# Patient Record
Sex: MALE | Race: WHITE | NOT HISPANIC OR LATINO | URBAN - METROPOLITAN AREA
[De-identification: names, ages, dates, MRNs, and addresses within clinical notes are randomized per-mention and may not be internally consistent; named-entity substitution may affect disease eponyms.]

---

## 2020-11-16 ENCOUNTER — IMPORTED ENCOUNTER (OUTPATIENT)
Dept: URBAN - METROPOLITAN AREA CLINIC 31 | Facility: CLINIC | Age: 30
End: 2020-11-16

## 2020-11-16 PROBLEM — H53.2: Noted: 2020-11-16

## 2020-11-16 PROBLEM — H49.22: Noted: 2020-11-16

## 2020-11-16 PROBLEM — H50.012: Noted: 2020-11-16

## 2020-11-16 PROCEDURE — 99203 OFFICE O/P NEW LOW 30 MIN: CPT

## 2020-11-16 NOTE — PATIENT DISCUSSION
Sixth Nerve Palsy OS -- the patient has signs and symptoms consistent with a left sixth nerve palsy. The condition and possible etiologies were discussed with the patient.

## 2020-11-16 NOTE — PATIENT DISCUSSION
1.  Diplopia  secondary to L 6th N paresis x 3 days. Went to ER Saturday and had CT brain? scan which patient reports was negative. No other tests done. No other know health problems. Intracranial lesion (tumor demyelination) Myasthenia gravis needs to be ruled out. Will order  MRI of brain and orbits. Will test for Myasthenia after MRI results. NO insurance.  Level 3 Medicare rate todayF/u 2 weeks

## 2022-04-02 ASSESSMENT — VISUAL ACUITY
OU_CC: 20/20
OD_CC: 20/20
OU_SC: J1+
OS_CC: 20/20-1